# Patient Record
Sex: MALE | URBAN - METROPOLITAN AREA
[De-identification: names, ages, dates, MRNs, and addresses within clinical notes are randomized per-mention and may not be internally consistent; named-entity substitution may affect disease eponyms.]

---

## 2020-07-17 ENCOUNTER — OFFICE VISIT (OUTPATIENT)
Dept: URBAN - METROPOLITAN AREA CLINIC 33 | Facility: CLINIC | Age: 50
End: 2020-07-17
Payer: COMMERCIAL

## 2020-07-17 DIAGNOSIS — T15.02XA FOREIGN BODY IN CORNEA, LEFT EYE, INITIAL ENCOUNTER: ICD-10-CM

## 2020-07-17 DIAGNOSIS — H57.12 OCULAR PAIN, LEFT EYE: Primary | ICD-10-CM

## 2020-07-17 PROCEDURE — 99203 OFFICE O/P NEW LOW 30 MIN: CPT | Performed by: OPTOMETRIST

## 2020-07-17 PROCEDURE — 65222 REMOVE FOREIGN BODY FROM EYE: CPT | Performed by: OPTOMETRIST

## 2020-07-17 RX ORDER — NEOMYCIN SULFATE, POLYMYXIN B SULFATE AND DEXAMETHASONE 3.5; 10000; 1 MG/ML; [USP'U]/ML; MG/ML
SUSPENSION OPHTHALMIC
Qty: 1 | Refills: 0 | Status: ACTIVE
Start: 2020-07-17

## 2020-07-17 ASSESSMENT — INTRAOCULAR PRESSURE
OS: 10
OD: 11

## 2020-07-17 NOTE — IMPRESSION/PLAN
Impression: Ocular pain, left eye: O61.26. Plan: Metallic foreign body OS from cutting door. Foreign body removed in office with forceps and angelita. Recommend patient to start Maxitrol TID OS. RXd Maxitrol TID OS

RTC in 1 week for follow up.

## 2020-07-22 ENCOUNTER — OFFICE VISIT (OUTPATIENT)
Dept: URBAN - METROPOLITAN AREA CLINIC 33 | Facility: CLINIC | Age: 50
End: 2020-07-22
Payer: COMMERCIAL

## 2020-07-22 PROCEDURE — 99213 OFFICE O/P EST LOW 20 MIN: CPT | Performed by: OPTOMETRIST

## 2020-07-22 ASSESSMENT — INTRAOCULAR PRESSURE
OS: 12
OD: 12

## 2020-07-22 NOTE — IMPRESSION/PLAN
Impression: Foreign body in cornea, left eye, subsequent encounter: T15.02XD. Plan: Rust ring with mild haze OS. Patient reports no pain. Recommend patient continues Maxitrol TID OS, to clear corneal haze.

## 2020-08-05 ENCOUNTER — OFFICE VISIT (OUTPATIENT)
Dept: URBAN - METROPOLITAN AREA CLINIC 33 | Facility: CLINIC | Age: 50
End: 2020-08-05
Payer: COMMERCIAL

## 2020-08-05 DIAGNOSIS — T15.02XD FOREIGN BODY IN CORNEA, LEFT EYE, SUBSEQUENT ENCOUNTER: Primary | ICD-10-CM

## 2020-08-05 PROCEDURE — 99213 OFFICE O/P EST LOW 20 MIN: CPT | Performed by: OPTOMETRIST

## 2020-08-05 ASSESSMENT — INTRAOCULAR PRESSURE
OD: 12
OS: 14

## 2020-08-05 NOTE — IMPRESSION/PLAN
Impression: Foreign body in cornea, left eye, subsequent encounter: T15.02XD. Plan: Resolved condition OS. Discussed hazy vision with patient and recommend patient return for updated MRX. Discontinue Maxitrol at this time. Continue artificial tears as  needed.